# Patient Record
Sex: FEMALE | Race: WHITE | Employment: PART TIME | URBAN - METROPOLITAN AREA
[De-identification: names, ages, dates, MRNs, and addresses within clinical notes are randomized per-mention and may not be internally consistent; named-entity substitution may affect disease eponyms.]

---

## 2019-12-19 PROBLEM — O09.521 AMA (ADVANCED MATERNAL AGE) MULTIGRAVIDA 35+, FIRST TRIMESTER: Status: ACTIVE | Noted: 2019-12-19

## 2019-12-19 PROBLEM — O34.219 PREVIOUS CESAREAN DELIVERY, ANTEPARTUM: Status: ACTIVE | Noted: 2019-12-19

## 2019-12-19 PROBLEM — O99.331 TOBACCO SMOKING AFFECTING PREGNANCY IN FIRST TRIMESTER: Status: ACTIVE | Noted: 2019-12-19

## 2019-12-19 LAB
CHLAMYDIA, EXTERNAL: NEGATIVE
HBSAG, EXTERNAL: NEGATIVE
HEPATITIS C AB,   EXT: NEGATIVE
HIV, EXTERNAL: NON REACTIVE
N. GONORRHEA, EXTERNAL: NEGATIVE
RPR, EXTERNAL: NON REACTIVE
RUBELLA, EXTERNAL: NORMAL

## 2020-02-13 PROBLEM — O99.343 MENTAL DISORDER COMPLICATING PREGNANCY, THIRD TRIMESTER: Status: ACTIVE | Noted: 2020-02-13

## 2020-02-13 PROBLEM — O99.342 MENTAL DISORDER COMPLICATING PREGNANCY, SECOND TRIMESTER: Status: ACTIVE | Noted: 2020-02-13

## 2020-02-13 PROBLEM — O09.522 MULTIGRAVIDA OF ADVANCED MATERNAL AGE IN SECOND TRIMESTER: Status: ACTIVE | Noted: 2019-12-19

## 2020-03-05 PROBLEM — O99.331 TOBACCO SMOKING AFFECTING PREGNANCY IN FIRST TRIMESTER: Status: RESOLVED | Noted: 2019-12-19 | Resolved: 2020-03-05

## 2020-03-09 PROBLEM — O34.10 UTERINE FIBROID DURING PREGNANCY, ANTEPARTUM: Status: ACTIVE | Noted: 2020-03-09

## 2020-03-09 PROBLEM — D25.9 UTERINE FIBROID DURING PREGNANCY, ANTEPARTUM: Status: ACTIVE | Noted: 2020-03-09

## 2020-05-04 PROBLEM — O09.523 MULTIGRAVIDA OF ADVANCED MATERNAL AGE IN THIRD TRIMESTER: Status: ACTIVE | Noted: 2019-12-19

## 2020-07-01 LAB — GRBS, EXTERNAL: NEGATIVE

## 2020-07-23 NOTE — PROGRESS NOTES
Patient ID verified. Allergies, medical history, prenatal record and prior to admission medications verified. Pt instructed to be NPO after midnight. Pt instructed  to arrive at hospital at [facility] come to entrance C and sign in at the registration desk on the 4th floor. Patient instructed to come to hospital sooner if SROM, labor, or concerning symptoms. Patient verbalized understanding. Questions encouraged and answered.

## 2020-07-27 ENCOUNTER — ANESTHESIA EVENT (OUTPATIENT)
Dept: LABOR AND DELIVERY | Age: 35
End: 2020-07-27
Payer: COMMERCIAL

## 2020-07-27 ENCOUNTER — ANESTHESIA (OUTPATIENT)
Dept: LABOR AND DELIVERY | Age: 35
End: 2020-07-27
Payer: COMMERCIAL

## 2020-07-27 ENCOUNTER — HOSPITAL ENCOUNTER (INPATIENT)
Age: 35
LOS: 3 days | Discharge: HOME | End: 2020-07-30
Attending: OBSTETRICS & GYNECOLOGY | Admitting: OBSTETRICS & GYNECOLOGY
Payer: COMMERCIAL

## 2020-07-27 PROBLEM — Z98.891 H/O CESAREAN SECTION: Status: ACTIVE | Noted: 2020-07-27

## 2020-07-27 PROBLEM — O09.529 ADVANCED MATERNAL AGE IN MULTIGRAVIDA: Status: ACTIVE | Noted: 2020-07-27

## 2020-07-27 PROBLEM — Z3A.39 39 WEEKS GESTATION OF PREGNANCY: Status: ACTIVE | Noted: 2020-07-27

## 2020-07-27 LAB
ABO + RH BLD: NORMAL
ALBUMIN SERPL-MCNC: 2.6 G/DL (ref 3.5–5)
ALBUMIN/GLOB SERPL: 0.6 {RATIO} (ref 1.2–3.5)
ALP SERPL-CCNC: 137 U/L (ref 50–136)
ALT SERPL-CCNC: 22 U/L (ref 12–65)
ANION GAP SERPL CALC-SCNC: 9 MMOL/L (ref 7–16)
AST SERPL-CCNC: 20 U/L (ref 15–37)
BASOPHILS # BLD: 0.1 K/UL (ref 0–0.2)
BASOPHILS NFR BLD: 1 % (ref 0–2)
BILIRUB SERPL-MCNC: 0.2 MG/DL (ref 0.2–1.1)
BLOOD GROUP ANTIBODIES SERPL: NORMAL
BUN SERPL-MCNC: 9 MG/DL (ref 6–23)
CALCIUM SERPL-MCNC: 9.1 MG/DL (ref 8.3–10.4)
CHLORIDE SERPL-SCNC: 108 MMOL/L (ref 98–107)
CO2 SERPL-SCNC: 19 MMOL/L (ref 21–32)
CREAT SERPL-MCNC: 0.48 MG/DL (ref 0.6–1)
DIFFERENTIAL METHOD BLD: NORMAL
EOSINOPHIL # BLD: 0.1 K/UL (ref 0–0.8)
EOSINOPHIL NFR BLD: 1 % (ref 0.5–7.8)
ERYTHROCYTE [DISTWIDTH] IN BLOOD BY AUTOMATED COUNT: 13.6 % (ref 11.9–14.6)
GLOBULIN SER CALC-MCNC: 4.7 G/DL (ref 2.3–3.5)
GLUCOSE SERPL-MCNC: 95 MG/DL (ref 65–100)
HCT VFR BLD AUTO: 39.2 % (ref 35.8–46.3)
HGB BLD-MCNC: 13.1 G/DL (ref 11.7–15.4)
IMM GRANULOCYTES # BLD AUTO: 0.1 K/UL (ref 0–0.5)
IMM GRANULOCYTES NFR BLD AUTO: 1 % (ref 0–5)
LYMPHOCYTES # BLD: 2.5 K/UL (ref 0.5–4.6)
LYMPHOCYTES NFR BLD: 26 % (ref 13–44)
MCH RBC QN AUTO: 30.8 PG (ref 26.1–32.9)
MCHC RBC AUTO-ENTMCNC: 33.4 G/DL (ref 31.4–35)
MCV RBC AUTO: 92.2 FL (ref 79.6–97.8)
MONOCYTES # BLD: 1 K/UL (ref 0.1–1.3)
MONOCYTES NFR BLD: 11 % (ref 4–12)
NEUTS SEG # BLD: 6 K/UL (ref 1.7–8.2)
NEUTS SEG NFR BLD: 61 % (ref 43–78)
NRBC # BLD: 0 K/UL (ref 0–0.2)
PLATELET # BLD AUTO: 207 K/UL (ref 150–450)
PMV BLD AUTO: 10.6 FL (ref 9.4–12.3)
POTASSIUM SERPL-SCNC: 3.7 MMOL/L (ref 3.5–5.1)
PROT SERPL-MCNC: 7.3 G/DL (ref 6.3–8.2)
RBC # BLD AUTO: 4.25 M/UL (ref 4.05–5.2)
SODIUM SERPL-SCNC: 136 MMOL/L (ref 136–145)
SPECIMEN EXP DATE BLD: NORMAL
WBC # BLD AUTO: 9.8 K/UL (ref 4.3–11.1)

## 2020-07-27 PROCEDURE — 77030027744 HC PWDR HEMSTAT ARISTA ABSRB 5GM BARD -D: Performed by: OBSTETRICS & GYNECOLOGY

## 2020-07-27 PROCEDURE — C1765 ADHESION BARRIER: HCPCS | Performed by: OBSTETRICS & GYNECOLOGY

## 2020-07-27 PROCEDURE — 76010000392 HC C SECN EA ADDL 0.5 HR: Performed by: OBSTETRICS & GYNECOLOGY

## 2020-07-27 PROCEDURE — 77030019905 HC CATH URETH INTMIT MDII -A

## 2020-07-27 PROCEDURE — 77030007880 HC KT SPN EPDRL BBMI -B: Performed by: ANESTHESIOLOGY

## 2020-07-27 PROCEDURE — 77030032490 HC SLV COMPR SCD KNE COVD -B: Performed by: OBSTETRICS & GYNECOLOGY

## 2020-07-27 PROCEDURE — 80053 COMPREHEN METABOLIC PANEL: CPT

## 2020-07-27 PROCEDURE — 74011000250 HC RX REV CODE- 250: Performed by: OBSTETRICS & GYNECOLOGY

## 2020-07-27 PROCEDURE — 74011000250 HC RX REV CODE- 250: Performed by: STUDENT IN AN ORGANIZED HEALTH CARE EDUCATION/TRAINING PROGRAM

## 2020-07-27 PROCEDURE — 74011000250 HC RX REV CODE- 250: Performed by: ANESTHESIOLOGY

## 2020-07-27 PROCEDURE — 75410000003 HC RECOV DEL/VAG/CSECN EA 0.5 HR: Performed by: OBSTETRICS & GYNECOLOGY

## 2020-07-27 PROCEDURE — 74011250636 HC RX REV CODE- 250/636: Performed by: OBSTETRICS & GYNECOLOGY

## 2020-07-27 PROCEDURE — 77030002966 HC SUT PDS J&J -A: Performed by: OBSTETRICS & GYNECOLOGY

## 2020-07-27 PROCEDURE — 76010000391 HC C SECN FIRST 1 HR: Performed by: OBSTETRICS & GYNECOLOGY

## 2020-07-27 PROCEDURE — 85025 COMPLETE CBC W/AUTO DIFF WBC: CPT

## 2020-07-27 PROCEDURE — 77030031139 HC SUT VCRL2 J&J -A: Performed by: OBSTETRICS & GYNECOLOGY

## 2020-07-27 PROCEDURE — 86900 BLOOD TYPING SEROLOGIC ABO: CPT

## 2020-07-27 PROCEDURE — 77030040361 HC SLV COMPR DVT MDII -B

## 2020-07-27 PROCEDURE — 76060000078 HC EPIDURAL ANESTHESIA: Performed by: OBSTETRICS & GYNECOLOGY

## 2020-07-27 PROCEDURE — 74011250637 HC RX REV CODE- 250/637: Performed by: ANESTHESIOLOGY

## 2020-07-27 PROCEDURE — 77030008459 HC STPLR SKN COOP -B: Performed by: OBSTETRICS & GYNECOLOGY

## 2020-07-27 PROCEDURE — 59510 CESAREAN DELIVERY: CPT | Performed by: OBSTETRICS & GYNECOLOGY

## 2020-07-27 PROCEDURE — 77030002933 HC SUT MCRYL J&J -A: Performed by: OBSTETRICS & GYNECOLOGY

## 2020-07-27 PROCEDURE — 74011250636 HC RX REV CODE- 250/636: Performed by: ANESTHESIOLOGY

## 2020-07-27 PROCEDURE — 77030003665 HC NDL SPN BBMI -A: Performed by: ANESTHESIOLOGY

## 2020-07-27 PROCEDURE — 77030018846 HC SOL IRR STRL H20 ICUM -A: Performed by: OBSTETRICS & GYNECOLOGY

## 2020-07-27 PROCEDURE — 77030018836 HC SOL IRR NACL ICUM -A: Performed by: OBSTETRICS & GYNECOLOGY

## 2020-07-27 PROCEDURE — 74011250636 HC RX REV CODE- 250/636: Performed by: STUDENT IN AN ORGANIZED HEALTH CARE EDUCATION/TRAINING PROGRAM

## 2020-07-27 PROCEDURE — 65270000029 HC RM PRIVATE

## 2020-07-27 RX ORDER — MORPHINE SULFATE 10 MG/ML
5 INJECTION, SOLUTION INTRAMUSCULAR; INTRAVENOUS
Status: DISPENSED | OUTPATIENT
Start: 2020-07-27 | End: 2020-07-28

## 2020-07-27 RX ORDER — SODIUM CHLORIDE 0.9 % (FLUSH) 0.9 %
5-40 SYRINGE (ML) INJECTION AS NEEDED
Status: DISCONTINUED | OUTPATIENT
Start: 2020-07-27 | End: 2020-07-28

## 2020-07-27 RX ORDER — OXYTOCIN/RINGER'S LACTATE 30/500 ML
PLASTIC BAG, INJECTION (ML) INTRAVENOUS
Status: DISCONTINUED | OUTPATIENT
Start: 2020-07-27 | End: 2020-07-27 | Stop reason: HOSPADM

## 2020-07-27 RX ORDER — ONDANSETRON 2 MG/ML
INJECTION INTRAMUSCULAR; INTRAVENOUS AS NEEDED
Status: DISCONTINUED | OUTPATIENT
Start: 2020-07-27 | End: 2020-07-27 | Stop reason: HOSPADM

## 2020-07-27 RX ORDER — MORPHINE SULFATE 0.5 MG/ML
INJECTION, SOLUTION EPIDURAL; INTRATHECAL; INTRAVENOUS AS NEEDED
Status: DISCONTINUED | OUTPATIENT
Start: 2020-07-27 | End: 2020-07-27 | Stop reason: HOSPADM

## 2020-07-27 RX ORDER — DIPHENHYDRAMINE HYDROCHLORIDE 50 MG/ML
12.5 INJECTION, SOLUTION INTRAMUSCULAR; INTRAVENOUS
Status: DISCONTINUED | OUTPATIENT
Start: 2020-07-27 | End: 2020-07-30 | Stop reason: HOSPADM

## 2020-07-27 RX ORDER — KETOROLAC TROMETHAMINE 30 MG/ML
INJECTION, SOLUTION INTRAMUSCULAR; INTRAVENOUS AS NEEDED
Status: DISCONTINUED | OUTPATIENT
Start: 2020-07-27 | End: 2020-07-27 | Stop reason: HOSPADM

## 2020-07-27 RX ORDER — NALOXONE HYDROCHLORIDE 0.4 MG/ML
0.1 INJECTION, SOLUTION INTRAMUSCULAR; INTRAVENOUS; SUBCUTANEOUS
Status: DISCONTINUED | OUTPATIENT
Start: 2020-07-27 | End: 2020-07-30 | Stop reason: HOSPADM

## 2020-07-27 RX ORDER — KETOROLAC TROMETHAMINE 30 MG/ML
30 INJECTION, SOLUTION INTRAMUSCULAR; INTRAVENOUS
Status: DISPENSED | OUTPATIENT
Start: 2020-07-27 | End: 2020-07-28

## 2020-07-27 RX ORDER — BUPIVACAINE HYDROCHLORIDE 7.5 MG/ML
INJECTION, SOLUTION INTRASPINAL
Status: COMPLETED | OUTPATIENT
Start: 2020-07-27 | End: 2020-07-27

## 2020-07-27 RX ORDER — CEFAZOLIN SODIUM/WATER 2 G/20 ML
2 SYRINGE (ML) INTRAVENOUS ONCE
Status: COMPLETED | OUTPATIENT
Start: 2020-07-27 | End: 2020-07-27

## 2020-07-27 RX ORDER — ONDANSETRON 2 MG/ML
4 INJECTION INTRAMUSCULAR; INTRAVENOUS AS NEEDED
Status: DISCONTINUED | OUTPATIENT
Start: 2020-07-27 | End: 2020-07-28

## 2020-07-27 RX ORDER — SODIUM CHLORIDE 0.9 % (FLUSH) 0.9 %
5-40 SYRINGE (ML) INJECTION EVERY 8 HOURS
Status: DISCONTINUED | OUTPATIENT
Start: 2020-07-27 | End: 2020-07-27 | Stop reason: HOSPADM

## 2020-07-27 RX ORDER — MIDAZOLAM HYDROCHLORIDE 1 MG/ML
INJECTION, SOLUTION INTRAMUSCULAR; INTRAVENOUS AS NEEDED
Status: DISCONTINUED | OUTPATIENT
Start: 2020-07-27 | End: 2020-07-27 | Stop reason: HOSPADM

## 2020-07-27 RX ORDER — SODIUM CHLORIDE 0.9 % (FLUSH) 0.9 %
5-40 SYRINGE (ML) INJECTION AS NEEDED
Status: DISCONTINUED | OUTPATIENT
Start: 2020-07-27 | End: 2020-07-27 | Stop reason: HOSPADM

## 2020-07-27 RX ORDER — SODIUM CHLORIDE, SODIUM LACTATE, POTASSIUM CHLORIDE, CALCIUM CHLORIDE 600; 310; 30; 20 MG/100ML; MG/100ML; MG/100ML; MG/100ML
150 INJECTION, SOLUTION INTRAVENOUS CONTINUOUS
Status: DISCONTINUED | OUTPATIENT
Start: 2020-07-27 | End: 2020-07-27 | Stop reason: HOSPADM

## 2020-07-27 RX ORDER — SODIUM CHLORIDE 0.9 % (FLUSH) 0.9 %
5-40 SYRINGE (ML) INJECTION EVERY 8 HOURS
Status: DISCONTINUED | OUTPATIENT
Start: 2020-07-27 | End: 2020-07-30 | Stop reason: HOSPADM

## 2020-07-27 RX ORDER — OXYTOCIN/RINGER'S LACTATE 30/500 ML
250 PLASTIC BAG, INJECTION (ML) INTRAVENOUS ONCE
Status: DISCONTINUED | OUTPATIENT
Start: 2020-07-27 | End: 2020-07-27 | Stop reason: HOSPADM

## 2020-07-27 RX ORDER — SODIUM CHLORIDE, SODIUM LACTATE, POTASSIUM CHLORIDE, CALCIUM CHLORIDE 600; 310; 30; 20 MG/100ML; MG/100ML; MG/100ML; MG/100ML
75 INJECTION, SOLUTION INTRAVENOUS CONTINUOUS
Status: DISCONTINUED | OUTPATIENT
Start: 2020-07-27 | End: 2020-07-28

## 2020-07-27 RX ORDER — DEXTROSE, SODIUM CHLORIDE, SODIUM LACTATE, POTASSIUM CHLORIDE, AND CALCIUM CHLORIDE 5; .6; .31; .03; .02 G/100ML; G/100ML; G/100ML; G/100ML; G/100ML
125 INJECTION, SOLUTION INTRAVENOUS CONTINUOUS
Status: DISCONTINUED | OUTPATIENT
Start: 2020-07-27 | End: 2020-07-27 | Stop reason: HOSPADM

## 2020-07-27 RX ORDER — EPHEDRINE SULFATE/0.9% NACL/PF 50 MG/5 ML
SYRINGE (ML) INTRAVENOUS AS NEEDED
Status: DISCONTINUED | OUTPATIENT
Start: 2020-07-27 | End: 2020-07-27 | Stop reason: HOSPADM

## 2020-07-27 RX ORDER — TRISODIUM CITRATE DIHYDRATE AND CITRIC ACID MONOHYDRATE 500; 334 MG/5ML; MG/5ML
30 SOLUTION ORAL ONCE
Status: COMPLETED | OUTPATIENT
Start: 2020-07-27 | End: 2020-07-27

## 2020-07-27 RX ORDER — FENTANYL CITRATE 50 UG/ML
INJECTION, SOLUTION INTRAMUSCULAR; INTRAVENOUS AS NEEDED
Status: DISCONTINUED | OUTPATIENT
Start: 2020-07-27 | End: 2020-07-27 | Stop reason: HOSPADM

## 2020-07-27 RX ORDER — OXYCODONE HYDROCHLORIDE 5 MG/1
10 TABLET ORAL
Status: DISPENSED | OUTPATIENT
Start: 2020-07-27 | End: 2020-07-28

## 2020-07-27 RX ADMIN — Medication 500 ML/HR: at 08:19

## 2020-07-27 RX ADMIN — CEFAZOLIN SODIUM 2 G: 100 INJECTION, POWDER, LYOPHILIZED, FOR SOLUTION INTRAVENOUS at 07:46

## 2020-07-27 RX ADMIN — BUPIVACAINE HYDROCHLORIDE IN DEXTROSE 8 MG: 7.5 INJECTION, SOLUTION SUBARACHNOID at 07:57

## 2020-07-27 RX ADMIN — SODIUM CHLORIDE, SODIUM LACTATE, POTASSIUM CHLORIDE, AND CALCIUM CHLORIDE 75 ML/HR: 600; 310; 30; 20 INJECTION, SOLUTION INTRAVENOUS at 09:55

## 2020-07-27 RX ADMIN — PHENYLEPHRINE HYDROCHLORIDE 160 MCG: 10 INJECTION INTRAVENOUS at 08:44

## 2020-07-27 RX ADMIN — Medication 20 MG: at 08:05

## 2020-07-27 RX ADMIN — FENTANYL CITRATE 100 MCG: 50 INJECTION INTRAMUSCULAR; INTRAVENOUS at 08:32

## 2020-07-27 RX ADMIN — MORPHINE SULFATE 200 MCG: 0.5 INJECTION, SOLUTION EPIDURAL; INTRATHECAL; INTRAVENOUS at 07:57

## 2020-07-27 RX ADMIN — KETOROLAC TROMETHAMINE 30 MG: 30 INJECTION, SOLUTION INTRAMUSCULAR at 20:17

## 2020-07-27 RX ADMIN — MORPHINE SULFATE 2.8 MG: 0.5 INJECTION, SOLUTION EPIDURAL; INTRATHECAL; INTRAVENOUS at 08:24

## 2020-07-27 RX ADMIN — KETOROLAC TROMETHAMINE 30 MG: 30 INJECTION, SOLUTION INTRAMUSCULAR; INTRAVENOUS at 08:44

## 2020-07-27 RX ADMIN — MORPHINE SULFATE 5 MG: 10 INJECTION INTRAVENOUS at 09:26

## 2020-07-27 RX ADMIN — MORPHINE SULFATE 2 MG: 0.5 INJECTION, SOLUTION EPIDURAL; INTRATHECAL; INTRAVENOUS at 08:22

## 2020-07-27 RX ADMIN — MIDAZOLAM 2 MG: 1 INJECTION INTRAMUSCULAR; INTRAVENOUS at 08:22

## 2020-07-27 RX ADMIN — SODIUM CITRATE AND CITRIC ACID MONOHYDRATE 30 ML: 500; 334 SOLUTION ORAL at 07:45

## 2020-07-27 RX ADMIN — PHENYLEPHRINE HYDROCHLORIDE 160 MCG: 10 INJECTION INTRAVENOUS at 08:06

## 2020-07-27 RX ADMIN — OXYCODONE 10 MG: 5 TABLET ORAL at 13:05

## 2020-07-27 RX ADMIN — ONDANSETRON 4 MG: 2 INJECTION INTRAMUSCULAR; INTRAVENOUS at 08:19

## 2020-07-27 RX ADMIN — SODIUM CHLORIDE, SODIUM LACTATE, POTASSIUM CHLORIDE, AND CALCIUM CHLORIDE 150 ML/HR: 600; 310; 30; 20 INJECTION, SOLUTION INTRAVENOUS at 07:00

## 2020-07-27 NOTE — ROUTINE PROCESS
SBAR OUT Report: Mother Verbal report given to Lon [facility] RN on this patient, who is now being transferred to [facility] (unit) for routine progression of care. The patient is wearing a green \"Anesthesia-Duramorph\" band. Report consisted of patient's Situation, Background, Assessment and Recommendations (SBAR). [place] ID bands were compared with the identification form, and verified with the patient and receiving nurse. Information from the SBAR and Kardex and the [address] [facility] Report was reviewed with the receiving nurse; opportunity for questions and clarification provided.

## 2020-07-27 NOTE — ANESTHESIA POSTPROCEDURE EVALUATION
Procedure(s):   SECTION.     spinal    Anesthesia Post Evaluation      Multimodal analgesia: multimodal analgesia used between 6 hours prior to anesthesia start to PACU discharge  Patient location during evaluation: floor  Patient participation: complete - patient participated  Level of consciousness: awake  Pain management: adequate  Airway patency: patent  Anesthetic complications: no  Cardiovascular status: acceptable  Respiratory status: acceptable  Hydration status: acceptable  Post anesthesia nausea and vomiting:  none  Final Post Anesthesia Temperature Assessment:  Normothermia (36.0-37.5 degrees C)      INITIAL Post-op Vital signs:   Vitals Value Taken Time   /75 2020  9:45 AM   Temp     Pulse 79 2020  9:45 AM   Resp 18 2020  9:45 AM   SpO2 100 % 2020  9:45 AM

## 2020-07-27 NOTE — ANESTHESIA PREPROCEDURE EVALUATION
Relevant Problems   No relevant active problems       Anesthetic History   No history of anesthetic complications            Review of Systems / Medical History  Patient summary reviewed and pertinent labs reviewed    Pulmonary  Within defined limits                 Neuro/Psych         Psychiatric history    Comments: Depression, borderline personality disorder Cardiovascular                  Exercise tolerance: >4 METS     GI/Hepatic/Renal  Within defined limits              Endo/Other        Obesity     Other Findings            Physical Exam    Airway  Mallampati: II  TM Distance: > 6 cm  Neck ROM: normal range of motion   Mouth opening: Normal     Cardiovascular    Rhythm: regular           Dental  No notable dental hx       Pulmonary                 Abdominal  GI exam deferred       Other Findings            Anesthetic Plan    ASA: 3  Anesthesia type: spinal      Post-op pain plan if not by surgeon: intrathecal opiates    Induction: Intravenous  Anesthetic plan and risks discussed with: Patient

## 2020-07-27 NOTE — PROGRESS NOTES
Received care of pt from ANTIONE Larkin. Plan of care reviewed with pt and , both verbalizes understanding.

## 2020-07-27 NOTE — LACTATION NOTE
This note was copied from a baby's chart. Lactation visit. 2nd time mom, did breastfeed first child who is a teenager now. This baby only a few hours old. Mom reports that baby recently latched and fed x 30 minutes on right breast. Mom states baby latches well and stays on well. Baby skin to skin with mom now. Discussed expectations for the first 24 hours of life. Reviewed feeding cues and feeding on demand. Attempt often. Baby has voided. Mom states right nipple sore post feed, lanolin given. Offered lactation assistance today with positioning/latch and observation of latch, mom encouraged to call out.

## 2020-07-27 NOTE — H&P
Scottie [facility] OB H&P      Assessment/Plan    Problem List  Date Reviewed: 2020          Codes Class    H/O  section ICD-10-CM: Z98.891  ICD-9-CM: V45.89         39 weeks gestation of pregnancy ICD-10-CM: Z3A.39  ICD-9-CM: V22.2         Advanced maternal age in multigravida ICD-10-CM: O09.529  ICD-9-CM: 659.60         Uterine fibroid during pregnancy, antepartum ICD-10-CM: O34.10, D25.9  ICD-9-CM: 654.13, 218.9     Overview Addendum 2020  9:18 AM by Inocencio Choudhury MD     3/9/2020 at [facility]:  Fibroid noted measuring 4.8 x 3.4 x 4.0 cm.    2020 at [facility]:  Fibroids stable in size and appearance. · Fibroid pain precautions given. · PTL and cervical insufficiency precautions given. Mental disorder affecting pregnancy in third trimester ICD-10-CM: O99.343  ICD-9-CM: 648.43     Overview Addendum 2020  9:17 AM by Inocencio Choudhury MD     2020: hx depression, anxiety, BPD.   3/9/2020 at [facility]:  Patient with depression/anxiety. Not currently on medication; stable. Denies SI/HI.  2020 at [facility]:  symptoms have developed, denies SI/HI - starting Zoloft  2020 at [facility]:  Stable on Zoloft 50 mg daily. Denies SI/HI. Multigravida of advanced maternal age in third trimester ICD-10-CM: O09.523  ICD-9-CM: 659.63     Overview Addendum 2020  2:09 PM by Elizabeth Castano NP     EDC by LMP confirmed by 7  weeks US  PLAN:  NIPT (neg x 3, male), baby ASA 12-36 weeks, MFM for anatomy/fetal echo,  testing around 35 weeks with delivery around 39 weeks  2020: Pt reports allergy to ASA (unsure reaction, told as child to avoid ASA) but is taking ASA 81mg daily  CF, SMA, AFP negative    3/9/2020 at [facility]:  Normal anatomy/fetal Echo, AC 49%. Fibroid noted measuring 4.8 x 3.4 x 4.0 cm. Met with genetic counselor today; see her report for full details. [id number] Negative, Male (drawn in OB's). 2020 at [facility]:  Appropriate fetal growth, normal f/u Echo. Fibroid stable in size and appearance. AC 49%, overall 45%, POLA 20 cm  6/10/2020:  EFW 46%, AC 48%, POLA 17.7 cm, vtx  2020: EFW 47% AC 58%, BPP 8/8, POLA nl, vertex  2020: EFW 51%, AC 50%, BPP 8/8, POLA nl, vertex             Previous  delivery, antepartum ICD-10-CM: O34.219  ICD-9-CM: 654.23     Overview Addendum 6/10/2020 11:49 AM by Jeremy Barriga MD     D/W pt risks of , including but not limited to: risk of uterine rupture during labor of approximately 1% with a subsequent 40-50% risk of  neurological injury and or death. D/W pt operative risks of repeat C/S including but not limited to death, bleeding, infection, damage to other internal organs and other risks involving future pregnancies and mode of delivery. Also, D/W pt increase in maternal and fetal morbidity after failed JAYDEN.     3/9/2020 [facility]: vascularity at site of MARY. MFM to reassess in 6-8 weeks -- WNL  2020:  Pt desires repeat when indicated    6/10/2020:  Scheduled for 20 @ 0800 [address] sched 6/10 at 11:48)                   Subjective    Claudell Endo 37 y.o.  39w2d  presented to L&D for repeat C/S. Pt has no complaints today. Pt denies vaginal bleeding/discharge. No LOF.  +FM. OB History    Para Term  AB Living   3 1 1 0 1 1   SAB TAB Ectopic Molar Multiple Live Births   1 0 0 0 0 1      # Outcome Date GA Lbr Riki/2nd Weight Sex Delivery Anes PTL Lv   3 Current            2 Term 06 40w0d  6 lb 3 oz (2.807 kg) F CS-Unspec EPI N RAMO      Complications: Other (comment), Fetal Intolerance, Failure to Progress in Second Stage   1 SAB  5w0d             Birth Comments: no D&C required      Obstetric Comments    was due to not dilating.        Past Medical History:   Diagnosis Date    Anxiety     Borderline personality disorder ([facility] [address]     Fibroid, uterine     Major depression        Past Surgical History:   Procedure Laterality Date    HX  SECTION  HX WISDOM TEETH EXTRACTION         Family History   Problem Relation Age of Onset   Esther Christopher Arthritis-rheumatoid Mother     Heart Attack Father     Cancer Brother     Cancer Maternal Grandmother        Social History     Socioeconomic History    Marital status:      Spouse name: Not on file    Number of children: Not on file    Years of education: Not on file    Highest education level: Not on file   Occupational History    Not on file   Social Needs    Financial resource strain: Not on file    Food insecurity     Worry: Not on file     Inability: Not on file    Transportation needs     Medical: Not on file     Non-medical: Not on file   Tobacco Use    Smoking status: Former Smoker     Last attempt to quit: 2020     Years since quittin.5    Smokeless tobacco: Never Used   Substance and Sexual Activity    Alcohol use: Not Currently    Drug use: Not Currently     Types: Marijuana    Sexual activity: Yes   Lifestyle    Physical activity     Days per week: Not on file     Minutes per session: Not on file    Stress: Not on file   Relationships    Social connections     Talks on phone: Not on file     Gets together: Not on file     Attends Confucianist service: Not on file     Active member of club or organization: Not on file     Attends meetings of clubs or organizations: Not on file     Relationship status: Not on file    Intimate partner violence     Fear of current or ex partner: No     Emotionally abused: No     Physically abused: No     Forced sexual activity: No   Other Topics Concern     Service Not Asked    Blood Transfusions Not Asked    Caffeine Concern No    Occupational Exposure Not Asked    Hobby Hazards Not Asked    Sleep Concern Not Asked    Stress Concern Not Asked    Weight Concern Not Asked    Special Diet Not Asked    Back Care Not Asked    Exercise No    Bike Helmet Not Asked    Seat Belt Yes    Self-Exams No   Social History Narrative    Abuse: Feels safe at home, no history of physical abuse, no history of sexual abuse       Allergies   Allergen Reactions    Aspirin Other (comments)     Reyes Disease    Gabapentin Hives    Lamotrigine Hives     Depression, mood swings  Lamictal         Review of Systems:    Constitutional: No fevers or chills     Prenatal: + fetal movement, no VB/DC, no LOF     CV: No chest pain or palpatations    Resp: No SOB or cough    GI: No nausea/vomiting/diarrhea/constipation    Neuro: No HA, no seizure like activity    Skin: No rashes or lesions     Breast: No breast pain    : No dysuria or hematuria      Prenatal Record Review    The prenatal record has been reviewed.     Prenatal Labs:   No results found for: RUBELLAEXT, GRBSEXT, HBSAGEXT, HIVEXT, RPREXT, GONNOEXT, CHLAMEXT    Objective    Visit Vitals  /82   Pulse 64   Temp 97.6 °F (36.4 °C)   Resp 16   Ht 5' 1\" (1.549 m)   Wt 208 lb (94.3 kg)   LMP 10/26/2019   Breastfeeding Yes   BMI 39.30 kg/m²           Physical Exam    Gen: alert and cooperative, NAD    HEENT: NCAT    CV: RRR    RESP: CTA bilat    ABD: Gravid, soft, NT    EXT: trace edema bilat    NEURO: No focal deficits    SKIN: No noted rashes or lesions       Dia Munoz MD  7:38 AM  07/27/20

## 2020-07-27 NOTE — L&D DELIVERY NOTE
Delivery Summary    Patient: Rachelle Diamond MRN: [id number]  SSN: [id number]    YOB: 1985  Age: 37 y.o. Sex: female        Information for the patient's :  Nita Camacho [[id number]]       Labor Events:    Labor: No    Steroids: None   Cervical Ripening Date/Time:       Cervical Ripening Type: None   Antibiotics During Labor: No   Rupture Identifier:      Rupture Date/Time: 2020 8:18 AM   Rupture Type: AROM   Amniotic Fluid Volume: Moderate    Amniotic Fluid Description: Clear    Amniotic Fluid Odor: None    Induction: None       Induction Date/Time:        Indications for Induction:      Augmentation: None   Augmentation Date/Time:      Indications for Augmentation:     Labor complications: None       Additional complications:        Delivery Events:  Indications For Episiotomy:     Episiotomy:     Perineal Laceration(s):     Repaired:     Periurethral Laceration Location:      Repaired:     Labial Laceration Location:     Repaired:     Sulcal Laceration Location:     Repaired:     Vaginal Laceration Location:     Repaired:     Cervical Laceration Location:     Repaired:     Repair Suture:     Number of Repair Packets:     Estimated Blood Loss (ml):  ml   Quantitaive Blood Loss (ml):             Delivery Date: 2020    Delivery Time: 8:18 AM   Delivery Type: , Low Transverse     Details    Trial of Labor: No   Primary/Repeat: Repeat   Priority: Routine   Indications:          Sex:  Male     Gestational Age: 44w2d  Delivery Clinician:  Brooke Hopson  Living Status: Living   Delivery Location: OR OR #2          APGARS  One minute Five minutes Ten minutes   Skin color: 0   1        Heart rate: 2   2        Grimace: 2   2        Muscle tone: 2   2        Breathin   2        Totals: 8   9          Presentation: Vertex    Position:        Resuscitation Method:  Suctioning-bulb; Tactile Stimulation     Meconium Stained:        Cord Information:    Complications:    Cord around:    Delayed cord clamping? Cord clamped date/time:   Disposition of Cord Blood:      Blood Gases Sent?:      Placenta:  Date/Time: 2020  8:19 AM  Removal:        Appearance:        Measurements:  Birth Weight: 7 lb 4.1 oz (3.29 kg)      Birth Length: 1' 8.08\" (0.51 m)      Head Circumference: 1' 2.17\" (0.36 m)      Chest Circumference: 1' 1.39\" (0.34 m)     Abdominal Girth: Other Providers:   Shayne Chavez, SARAH SALGUERO;COOKIE GRAHAM;SÁNCHEZ HUGGINS;CHRISTA GAMBOA;GAVIN ANSARI;LYRIC HARTMANN;EFFIE WONG;RENNY SMITH, Obstetrician;Primary Nurse;Primary  Nurse;Neonatologist;Anesthesiologist;Crna;Respiratory Therapist;Scrub Tech             Group B Strep: No results found for: GRBSEXT, GRBSEXT  Information for the patient's :  Ruel Krueger [[id number]]   No results found for: ABORH, PCTABR, PCTDIG, BILI, ABORHEXT, ABORH     No results for input(s): PCO2CB, PO2CB, HCO3I, SO2I, IBD, PTEMPI, SPECTI, PHICB, ISITE, IDEV, IALLEN in the last 72 hours.

## 2020-07-27 NOTE — ANESTHESIA PROCEDURE NOTES
Spinal Block    Start time: 7/27/2020 7:54 AM  End time: 7/27/2020 7:57 AM  Performed by: Yudelka Sorensen MD  Authorized by: Yudelka Sorensen MD     Pre-procedure:   Indications: primary anesthetic  Preanesthetic Checklist: patient identified, risks and benefits discussed, anesthesia consent, site marked, patient being monitored and timeout performed    Timeout Time: 07:54          Spinal Block:   Patient Position:  Seated  Prep Region:  Lumbar  Prep: chlorhexidine and patient draped      Location:  L2-3  Technique:  Single shot        Needle:   Needle Type:  Pencan  Needle Gauge:  25 G  Attempts:  1      Events: CSF confirmed, no blood with aspiration and no paresthesia        Assessment:  Insertion:  Uncomplicated  Patient tolerance:  Patient tolerated the procedure well with no immediate complications

## 2020-07-27 NOTE — PROGRESS NOTES
SBAR IN Report: Mother    Verbal report received from ANTIONE Dorsey RN (full name & credentials) on this patient, who is now being transferred from [facility] (unit) for routine post - op. The patient is wearing a green \"Anesthesia-Duramorph\" band. Report consisted of patient's Situation, Background, Assessment and Recommendations (SBAR). [place] ID bands were compared with the identification form, and verified with the patient and transferring nurse. Information from the Procedure Summary and the [place] Report was reviewed with the transferring nurse; opportunity for questions and clarification provided.

## 2020-07-27 NOTE — PROGRESS NOTES
0751:  In OR; pre spinal 's  0800: Cho; post spinal 's  0802:  Prep  3928:  Timeout  0811:  incision  0818:  Viable male, apgars 8/9 wt 7-4  0845: Incision closed  2479:  Dressing on   0856:  Out of OR; pt holding  skin to skin; QBL at this time, 785  0900:  Received care of pt from Orville Vo CRNA. Pt a/o x 3. Rates pain 4/10, declines interventions. NSR noted on cardiac monitor. Dressing to abdomen clean, dry and intact. Cho draining clear, yellow urine. Small amt of lochia noted. SCD's in place bilaterally. 5716:  C/o increase pain 5-6.  5 mg morphine given IVSP  0935:  Reports a decrease in pain  1000:  Recovery completed. No changes.

## 2020-07-27 NOTE — OP NOTES
Froy Ramos  1985    Preop Dx:   1. IUP @ 39 2/7 weeks gestation   2. Previous  - desires repeat    Postop Dx: Same    Procedure: repeat LTCS    Surgeon: Yfn Sánchez      Anesthesia: spinal    EBL: 600 mL  IVF: 900 mL  UOP: 798 mL    Complications: None    Findings:  Viable Male infant. Vtx position. APGARS 8,9.  Weight:  7 lbs, 4 oz. Normal appearing uterus, tubes and ovaries. Small (4-5 cm) anterior fundal fibroid. Procedure:  Pt was taken to the operating room where spinal anesthesia was found to be adequate. She was then prepped and draped in the usual sterile fashion and placed in the supine position with a leftward tilt. A Pfannenstiel skin incision was made with the scalpel and carried down to the underlying layer of fascia with the bovie. The fascia was incised in the midline and the incision extended laterally with the ferrara scissors. Superior of the fascial incision was grasped with Kocher clamps and  from the rectus muscles sharply and bluntly. In a similar fashion, the inferior aspect of the fascial incision was grasped with the Kocher clamps and  from the rectus muscles sharply and bluntly. The rectus muscles were  in the midline bluntly and peritoneum entered bluntly. The peritoneal incision was extended manually. Bladder blade was inserted and lower uterine incision made with the scalpel. Incision extended manually laterally. Infants head delivered atraumatically. Nose and mouth suctioned. Cord clamped and cut. Infant handed off to the waiting NICU staff. The uterus was exteriorized and cleared of all clots and debris. Hysterotomy was closed with 0-vicryl in a running, locking fashion. A second layer of 0-vicryl was placed in a interrupted figure of 8 fashion to imbricate the incision. The pelvis and gutters were cleared of all clots and debris, the uterus returned to the abdomen and hemostasis was assured throughout.   Intercede was placed over the hysterotomy site. Fascial incision repaired with 0-PDS in a running fashion. Subcutaneous tissue was cleared of all clots and debris and hemostasis assured. Subcutaneous tissue reapproximated with 3-0 vicryl rapide in a running fashion. Skin closed with Insorb in usual fashion. Pt tolerated procedure well. Sponge, lap and needle counts correct x 3. Disposition: Pt to RR stable and infant to  nursery stable.     Pathology: none      Chloé Hernandez MD  8:58 AM  20

## 2020-07-27 NOTE — LACTATION NOTE

## 2020-07-28 LAB
ERYTHROCYTE [DISTWIDTH] IN BLOOD BY AUTOMATED COUNT: 13.7 % (ref 11.9–14.6)
HCT VFR BLD AUTO: 30.9 % (ref 35.8–46.3)
HGB BLD-MCNC: 10.1 G/DL (ref 11.7–15.4)
MCH RBC QN AUTO: 30.9 PG (ref 26.1–32.9)
MCHC RBC AUTO-ENTMCNC: 32.7 G/DL (ref 31.4–35)
MCV RBC AUTO: 94.5 FL (ref 79.6–97.8)
NRBC # BLD: 0 K/UL (ref 0–0.2)
PLATELET # BLD AUTO: 146 K/UL (ref 150–450)
PMV BLD AUTO: 10.1 FL (ref 9.4–12.3)
RBC # BLD AUTO: 3.27 M/UL (ref 4.05–5.2)
WBC # BLD AUTO: 10.6 K/UL (ref 4.3–11.1)

## 2020-07-28 PROCEDURE — 65270000029 HC RM PRIVATE

## 2020-07-28 PROCEDURE — 74011250637 HC RX REV CODE- 250/637: Performed by: ANESTHESIOLOGY

## 2020-07-28 PROCEDURE — 74011250637 HC RX REV CODE- 250/637: Performed by: OBSTETRICS & GYNECOLOGY

## 2020-07-28 PROCEDURE — 36415 COLL VENOUS BLD VENIPUNCTURE: CPT

## 2020-07-28 PROCEDURE — 85027 COMPLETE CBC AUTOMATED: CPT

## 2020-07-28 PROCEDURE — 74011250636 HC RX REV CODE- 250/636: Performed by: ANESTHESIOLOGY

## 2020-07-28 RX ORDER — SODIUM CHLORIDE 0.9 % (FLUSH) 0.9 %
5-40 SYRINGE (ML) INJECTION EVERY 8 HOURS
Status: DISCONTINUED | OUTPATIENT
Start: 2020-07-28 | End: 2020-07-30 | Stop reason: HOSPADM

## 2020-07-28 RX ORDER — SODIUM CHLORIDE, SODIUM LACTATE, POTASSIUM CHLORIDE, CALCIUM CHLORIDE 600; 310; 30; 20 MG/100ML; MG/100ML; MG/100ML; MG/100ML
150 INJECTION, SOLUTION INTRAVENOUS CONTINUOUS
Status: DISCONTINUED | OUTPATIENT
Start: 2020-07-28 | End: 2020-07-28

## 2020-07-28 RX ORDER — IBUPROFEN 600 MG/1
600 TABLET ORAL
Status: DISCONTINUED | OUTPATIENT
Start: 2020-07-28 | End: 2020-07-30 | Stop reason: HOSPADM

## 2020-07-28 RX ORDER — PRENATAL VIT 96/IRON FUM/FOLIC 27MG-0.8MG
1 TABLET ORAL DAILY
Status: DISCONTINUED | OUTPATIENT
Start: 2020-07-28 | End: 2020-07-30 | Stop reason: HOSPADM

## 2020-07-28 RX ORDER — MORPHINE SULFATE 10 MG/ML
5 INJECTION, SOLUTION INTRAMUSCULAR; INTRAVENOUS
Status: DISCONTINUED | OUTPATIENT
Start: 2020-07-28 | End: 2020-07-28

## 2020-07-28 RX ORDER — OXYCODONE HYDROCHLORIDE 5 MG/1
5-10 TABLET ORAL
Status: DISCONTINUED | OUTPATIENT
Start: 2020-07-28 | End: 2020-07-30 | Stop reason: HOSPADM

## 2020-07-28 RX ORDER — DIPHENHYDRAMINE HCL 25 MG
25 CAPSULE ORAL
Status: DISCONTINUED | OUTPATIENT
Start: 2020-07-28 | End: 2020-07-30 | Stop reason: HOSPADM

## 2020-07-28 RX ORDER — ZOLPIDEM TARTRATE 5 MG/1
5 TABLET ORAL
Status: DISCONTINUED | OUTPATIENT
Start: 2020-07-28 | End: 2020-07-30 | Stop reason: HOSPADM

## 2020-07-28 RX ORDER — SODIUM CHLORIDE 0.9 % (FLUSH) 0.9 %
5-40 SYRINGE (ML) INJECTION AS NEEDED
Status: DISCONTINUED | OUTPATIENT
Start: 2020-07-28 | End: 2020-07-28

## 2020-07-28 RX ORDER — DOCUSATE SODIUM 100 MG/1
100 CAPSULE, LIQUID FILLED ORAL 2 TIMES DAILY
Status: DISCONTINUED | OUTPATIENT
Start: 2020-07-28 | End: 2020-07-30 | Stop reason: HOSPADM

## 2020-07-28 RX ORDER — ONDANSETRON 4 MG/1
4 TABLET, ORALLY DISINTEGRATING ORAL
Status: DISCONTINUED | OUTPATIENT
Start: 2020-07-28 | End: 2020-07-30 | Stop reason: HOSPADM

## 2020-07-28 RX ORDER — SIMETHICONE 80 MG
80 TABLET,CHEWABLE ORAL
Status: DISCONTINUED | OUTPATIENT
Start: 2020-07-28 | End: 2020-07-30 | Stop reason: HOSPADM

## 2020-07-28 RX ADMIN — DOCUSATE SODIUM 100 MG: 100 CAPSULE, LIQUID FILLED ORAL at 10:35

## 2020-07-28 RX ADMIN — IBUPROFEN 600 MG: 600 TABLET, FILM COATED ORAL at 20:59

## 2020-07-28 RX ADMIN — KETOROLAC TROMETHAMINE 30 MG: 30 INJECTION, SOLUTION INTRAMUSCULAR at 08:03

## 2020-07-28 RX ADMIN — SIMETHICONE 80 MG: 80 TABLET, CHEWABLE ORAL at 10:35

## 2020-07-28 RX ADMIN — SIMETHICONE 80 MG: 80 TABLET, CHEWABLE ORAL at 15:00

## 2020-07-28 RX ADMIN — DOCUSATE SODIUM 100 MG: 100 CAPSULE, LIQUID FILLED ORAL at 17:02

## 2020-07-28 RX ADMIN — KETOROLAC TROMETHAMINE 30 MG: 30 INJECTION, SOLUTION INTRAMUSCULAR at 02:33

## 2020-07-28 RX ADMIN — OXYCODONE 10 MG: 5 TABLET ORAL at 17:01

## 2020-07-28 RX ADMIN — OXYCODONE 10 MG: 5 TABLET ORAL at 00:55

## 2020-07-28 RX ADMIN — OXYCODONE 10 MG: 5 TABLET ORAL at 20:59

## 2020-07-28 RX ADMIN — OXYCODONE 10 MG: 5 TABLET ORAL at 06:36

## 2020-07-28 RX ADMIN — OXYCODONE 10 MG: 5 TABLET ORAL at 12:50

## 2020-07-28 RX ADMIN — PRENATAL VIT W/ FE FUMARATE-FA TAB 27-0.8 MG 1 TABLET: 27-0.8 TAB at 10:34

## 2020-07-28 RX ADMIN — IBUPROFEN 600 MG: 600 TABLET, FILM COATED ORAL at 15:00

## 2020-07-28 NOTE — PROGRESS NOTES
Progress Note                               Patient: Vicki Mcmanus MRN: [id number]  SSN: [id number]    YOB: 1985  Age: 37 y.o. Sex: female      1 Day Post-Op     Subjective:     Patient doing well postpartum without significant complaints. Voiding without difficulty. Patient reports normal lochia. . Breastfeeding: Yes     Objective:     Patient Vitals for the past 18 hrs:   Temp Pulse Resp BP SpO2   20 0700 97.9 °F (36.6 °C) 87 16 104/61 95 %   20 0354 98.3 °F (36.8 °C) 82 16 125/53 96 %   20 2303 98.3 °F (36.8 °C) 84 16 99/52 95 %   20 2000 98.4 °F (36.9 °C) 89 17 107/67 95 %       Temp (24hrs), Av.3 °F (36.8 °C), Min:97.9 °F (36.6 °C), Max:98.6 °F (37 °C)      Physical Exam:    General:   Patient without distress. Abdomen: Soft, fundus firm at level of umbilicus, nontender   Incision: Clean, dry, and intact without erythema   Lower Extremities: Negative for swelling, cords, or tenderness        Lab/Data Review:  CBC:    Recent Labs     20  0627 20  0606   WBC 10.6 9.8   HGB 10.1* 13.1   HCT 30.9* 39.2   * 207     Blood Type:   Lab Results   Component Value Date/Time    ABO/Rh(D) O POSITIVE 2020 06:06 AM      Infant's Blood Type: Information for the patient's :  Angi Saba [[id number]]     Lab Results   Component Value Date/Time    ABO/Rh(D) O POSITIVE 2020 08:18 AM          Assessment and Plan:     Patient appears to be having an uncomplicated post- course. Continue routine postoperative care and maternal education.     Melvin Guillermo MD  12:43 PM

## 2020-07-28 NOTE — PROGRESS NOTES
07/28/20 0101   Pain Assessment   Pain Scale 1 Numeric (0 - 10)   Pain Intensity 1 5   Pain Location 1 Abdomen; Incisional   Pain Description 1 Aching; Sore   Pain Intervention(s) 1 Medication (see MAR)     PRN Oxycodone 10mg for pain

## 2020-07-28 NOTE — PROGRESS NOTES
07/28/20 0642   Pain Assessment   Pain Scale 1 Numeric (0 - 10)   Pain Intensity 1 6   Pain Location 1 Abdomen; Incisional   Pain Description 1 Aching; Sore   Pain Intervention(s) 1 Medication (see MAR)     PRN Oxycodone 10mg for pain

## 2020-07-28 NOTE — LACTATION NOTE
This note was copied from a baby's chart. Mother informed that she needs to be feeding the infant every 2-3 hours even if she has to wake the infant up.

## 2020-07-28 NOTE — PROGRESS NOTES
Chart reviewed - multiple mental health diagnoses. SW met with patient/FOB while practicing social distancing. Patient denies any history of postpartum depression/anxiety specifically, but she states that she does experience generalized depression/anxiety. Patient is currently on Zoloft, but previously had not taken medication \"in years. \"  She was started on Zoloft in April due to increasing symptoms and feeling like things were \"getting bad. \"  She partially attributes these feelings to COVID/quarantining. Patient reports that the Zoloft has helped to managed her mood very well. Patient given informational packet on  mood & anxiety disorders (resources/education) & PCP list.    Family denies any additional needs from  at this time. Family has 's contact information should any needs/questions arise.     PRANAY Walton-CARLA  [address] [place] [address]   [phone]

## 2020-07-28 NOTE — PROGRESS NOTES
07/28/20 0235   Pain Assessment   Pain Scale 1 Numeric (0 - 10)   Pain Intensity 1 3   Pain Location 1 Abdomen; Incisional   Pain Description 1 Aching; Sore   Pain Intervention(s) 1 Medication (see MAR)     PRN Toradol 30mg/IV for pain.

## 2020-07-28 NOTE — LACTATION NOTE
This note was copied from a baby's chart. Mom already supplementing. Assisted with breastfeeding in football on L and R.  Baby fed poorly on L, some better on R. Did not stay on well on L, too shallow. Better latch on R. Suggested cross cradle for L side. Demonstrated manual lip flange. Encouraged frequent feeding and watch output. Mom desires to continue to supplement so started her pumping as well. Got drops. Demoed use of her pump in style and suction tested it. Discussed normal  behavior. May take baby a little while to figure out how to nurse well. Plan to continued trying at breast and pumping if no latch. Will follow output and weight loss. Will continue to assist with positioning and technique. Encouraged attempt at breast and follow plan.

## 2020-07-29 PROCEDURE — 65270000029 HC RM PRIVATE

## 2020-07-29 PROCEDURE — 74011250637 HC RX REV CODE- 250/637: Performed by: OBSTETRICS & GYNECOLOGY

## 2020-07-29 RX ADMIN — OXYCODONE 10 MG: 5 TABLET ORAL at 19:40

## 2020-07-29 RX ADMIN — DOCUSATE SODIUM 100 MG: 100 CAPSULE, LIQUID FILLED ORAL at 09:38

## 2020-07-29 RX ADMIN — IBUPROFEN 600 MG: 600 TABLET, FILM COATED ORAL at 18:24

## 2020-07-29 RX ADMIN — OXYCODONE 5 MG: 5 TABLET ORAL at 23:44

## 2020-07-29 RX ADMIN — OXYCODONE 10 MG: 5 TABLET ORAL at 00:45

## 2020-07-29 RX ADMIN — OXYCODONE 10 MG: 5 TABLET ORAL at 05:05

## 2020-07-29 RX ADMIN — IBUPROFEN 600 MG: 600 TABLET, FILM COATED ORAL at 03:24

## 2020-07-29 RX ADMIN — DOCUSATE SODIUM 100 MG: 100 CAPSULE, LIQUID FILLED ORAL at 18:24

## 2020-07-29 RX ADMIN — OXYCODONE 10 MG: 5 TABLET ORAL at 09:38

## 2020-07-29 RX ADMIN — IBUPROFEN 600 MG: 600 TABLET, FILM COATED ORAL at 11:26

## 2020-07-29 RX ADMIN — OXYCODONE 10 MG: 5 TABLET ORAL at 14:58

## 2020-07-29 RX ADMIN — PRENATAL VIT W/ FE FUMARATE-FA TAB 27-0.8 MG 1 TABLET: 27-0.8 TAB at 09:38

## 2020-07-29 NOTE — PROGRESS NOTES
07/29/20 1458   Pain Assessment   Pain Scale 1 Numeric (0 - 10)   Pain Intensity 1 5   Pain Location 1 Abdomen   Pain Orientation 1 Lower   Pain Description 1 Aching   Pain Intervention(s) 1 Medication (see MAR)   Vital Signs   Level of Consciousness Alert   medicated with Oxycodone 10mg per request of patient.

## 2020-07-29 NOTE — PROGRESS NOTES
Patient up to bathroom with RUDY champion assistance. Maty-care taught and completed. Questions encouraged and answered. Patient ambulating without difficulty, encouraged to call for needs or concerns. Verbalizes understanding.

## 2020-07-29 NOTE — LACTATION NOTE
This note was copied from a baby's chart. In to see mom and infant. Dad feeding baby a bottle of formula at this time. Mom states baby is latching well, but not interested in breast feeding due to not much milk yet. She has pumped a few times, but states not getting anything really. Reviewed normal pump volumes and encouraged if wanting milk to come in stronger, but stimulate whether breast feeding or pumping, etc. Offered to help mom w/ breast feeding today. Mom politely declined assistance. Will call for help as desired. Mom mostly must bottle feeding formula. Will follow up in am as needed.

## 2020-07-29 NOTE — PROGRESS NOTES
Problem:  Delivery: Day of Delivery  Goal: *Participates in infant care  Outcome: Resolved/Met     Problem:  Delivery: Postpartum Day 1  Goal: Off Pathway (Use only if patient is Off Pathway)  Outcome: Resolved/Met  Goal: Activity/Safety  Outcome: Resolved/Met  Goal: Consults, if ordered  Outcome: Resolved/Met  Goal: Diagnostic Test/Procedures  Outcome: Resolved/Met  Goal: Nutrition/Diet  Outcome: Resolved/Met  Goal: Discharge Planning  Outcome: Resolved/Met  Goal: Medications  Outcome: Resolved/Met  Goal: Respiratory  Outcome: Resolved/Met  Goal: Treatments/Interventions/Procedures  Outcome: Resolved/Met  Goal: Psychosocial  Outcome: Resolved/Met  Goal: *Vital signs within defined limits  Outcome: Resolved/Met  Goal: *Labs within defined limits  Outcome: Resolved/Met  Goal: *Hemodynamically stable  Outcome: Resolved/Met  Goal: *Optimal pain control at patient's stated goal  Outcome: Resolved/Met  Goal: *Participates in infant care  Outcome: Resolved/Met  Goal: *Demonstrates progressive activity  Outcome: Resolved/Met  Goal: *Tolerating diet  Outcome: Resolved/Met  Goal: *Performs self perineal care  Outcome: Resolved/Met

## 2020-07-29 NOTE — PROGRESS NOTES
07/29/20 0938   Pain Assessment   Pain Scale 1 Numeric (0 - 10)   Pain Intensity 1 7   Pain Location 1 Abdomen;Back   Pain Orientation 1 Lower   Pain Description 1 Aching   Pain Intervention(s) 1 Medication (see MAR)   Vital Signs   Level of Consciousness Alert   medicated with Oxycodone 10mg PO

## 2020-07-29 NOTE — PROGRESS NOTES
Progress Note                               Patient: Domonique Burt MRN: [id number]  SSN: [id number]    YOB: 1985  Age: 37 y.o. Sex: female      2 Days Post-Op     Subjective:     Patient doing well postpartum without significant complaints. Voiding without difficulty. Patient reports normal lochia. . Breastfeeding: Yes     Objective:     Patient Vitals for the past 18 hrs:   Temp Pulse Resp BP SpO2   20 0723 98.3 °F (36.8 °C) 82 16 107/68 95 %   20 2356 98.2 °F (36.8 °C) 91 16 125/69 95 %       Temp (24hrs), Av.8 °F (36.6 °C), Min:97 °F (36.1 °C), Max:98.3 °F (36.8 °C)      Physical Exam:    General:   Patient without distress. Abdomen: Soft, fundus firm at level of umbilicus, nontender   Incision: Clean, dry, and intact without erythema   Lower Extremities: Negative for swelling, cords, or tenderness        Lab/Data Review: All lab results for the last 24 hours reviewed. CBC:    Recent Labs     20  0627 20  0606   WBC 10.6 9.8   HGB 10.1* 13.1   HCT 30.9* 39.2   * 207     GBS:   Lab Results   Component Value Date/Time    GrBStrep, External Negative 2020     Blood Type:   Lab Results   Component Value Date/Time    ABO/Rh(D) O POSITIVE 2020 06:06 AM        Assessment and Plan:     Patient appears to be having an uncomplicated post- course. Continue routine postoperative care and maternal education.

## 2020-07-29 NOTE — PROGRESS NOTES
07/29/20 1825   Pain Assessment   Pain Scale 1 Numeric (0 - 10)   Pain Intensity 1 6   Pain Location 1 Abdomen   Pain Orientation 1 Lower   Pain Description 1 Aching   Pain Intervention(s) 1 Medication (see MAR)   Vital Signs   Level of Consciousness Alert   medicated with Motrin for pain.

## 2020-07-30 VITALS
SYSTOLIC BLOOD PRESSURE: 112 MMHG | BODY MASS INDEX: 39.27 KG/M2 | DIASTOLIC BLOOD PRESSURE: 69 MMHG | HEART RATE: 77 BPM | OXYGEN SATURATION: 95 % | RESPIRATION RATE: 18 BRPM | WEIGHT: 208 LBS | TEMPERATURE: 98 F | HEIGHT: 61 IN

## 2020-07-30 PROCEDURE — 74011250637 HC RX REV CODE- 250/637: Performed by: OBSTETRICS & GYNECOLOGY

## 2020-07-30 PROCEDURE — 74011250636 HC RX REV CODE- 250/636: Performed by: OBSTETRICS & GYNECOLOGY

## 2020-07-30 PROCEDURE — 90715 TDAP VACCINE 7 YRS/> IM: CPT | Performed by: OBSTETRICS & GYNECOLOGY

## 2020-07-30 RX ORDER — IBUPROFEN 800 MG/1
800 TABLET ORAL EVERY 8 HOURS
Qty: 21 TAB | Refills: 0 | Status: SHIPPED | OUTPATIENT
Start: 2020-07-30 | End: 2020-09-10

## 2020-07-30 RX ORDER — OXYCODONE HYDROCHLORIDE 5 MG/1
5 TABLET ORAL
Qty: 20 TAB | Refills: 0 | Status: SHIPPED | OUTPATIENT
Start: 2020-07-30 | End: 2020-08-06

## 2020-07-30 RX ADMIN — IBUPROFEN 600 MG: 600 TABLET, FILM COATED ORAL at 08:40

## 2020-07-30 RX ADMIN — OXYCODONE 10 MG: 5 TABLET ORAL at 03:49

## 2020-07-30 RX ADMIN — TETANUS TOXOID, REDUCED DIPHTHERIA TOXOID AND ACELLULAR PERTUSSIS VACCINE, ADSORBED 0.5 ML: 5; 2.5; 8; 8; 2.5 SUSPENSION INTRAMUSCULAR at 08:41

## 2020-07-30 RX ADMIN — OXYCODONE 10 MG: 5 TABLET ORAL at 12:49

## 2020-07-30 RX ADMIN — OXYCODONE 10 MG: 5 TABLET ORAL at 08:40

## 2020-07-30 RX ADMIN — PRENATAL VIT W/ FE FUMARATE-FA TAB 27-0.8 MG 1 TABLET: 27-0.8 TAB at 08:40

## 2020-07-30 RX ADMIN — DOCUSATE SODIUM 100 MG: 100 CAPSULE, LIQUID FILLED ORAL at 08:40

## 2020-07-30 RX ADMIN — IBUPROFEN 600 MG: 600 TABLET, FILM COATED ORAL at 01:07

## 2020-07-30 NOTE — DISCHARGE SUMMARY
Obstetrical Discharge Summary     Name: Yobany Valiente MRN: [id number]  SSN: [id number]    YOB: 1985  Age: 37 y.o. Sex: female      Allergies: Aspirin; Gabapentin; and Lamotrigine    Admit Date: 2020    Discharge Date: 2020     Admitting Physician: Pauline Courtney MD     Attending Physician:  Arlena Habermann, MD     * Admission Diagnoses: Advanced maternal age in multigravida [O09.529];H/O  section [Z98.891];39 weeks gestation of pregnancy [Z3A.39]    * Discharge Diagnoses:   Information for the patient's :  Nicolette Werner [[id number]]   Delivery of a 7 lb 4.1 oz (3.29 kg) male infant via , Low Transverse on 2020 at 8:18 AM  by Pauline Courtney. Apgars were 8  and 9 . Additional Diagnoses:   Hospital Problems as of 2020 Date Reviewed: 2020          Codes Class Noted - Resolved POA    Mental disorder affecting pregnancy in third trimester ICD-10-CM: O99.343  ICD-9-CM: 648.43  2020 - Present Yes    Overview Addendum 2020  9:17 AM by Arlena Habermann, MD     2020: hx depression, anxiety, BPD.   3/9/2020 at [facility]:  Patient with depression/anxiety. Not currently on medication; stable. Denies SI/HI.  2020 at [facility]:  symptoms have developed, denies SI/HI - starting Zoloft  2020 at [facility]:  Stable on Zoloft 50 mg daily. Denies SI/HI. Multigravida of advanced maternal age in third trimester ICD-10-CM: O09.523  ICD-9-CM: 659.63  2019 - Present Yes    Overview Addendum 2020  2:09 PM by Sky Collins NP     EDC by LMP confirmed by 7  weeks US  PLAN:  NIPT (neg x 3, male), baby ASA 12-36 weeks, MFM for anatomy/fetal echo,  testing around 35 weeks with delivery around 39 weeks  2020: Pt reports allergy to ASA (unsure reaction, told as child to avoid ASA) but is taking ASA 81mg daily  CF, SMA, AFP negative    3/9/2020 at [facility]:  Normal anatomy/fetal Echo, AC 49%. Fibroid noted measuring 4.8 x 3.4 x 4.0 cm. Met with genetic counselor today; see her report for full details. [id number] Negative, Male (drawn in OB's). 2020 at [facility]:  Appropriate fetal growth, normal f/u Echo. Fibroid stable in size and appearance. AC 49%, overall 45%, POLA 20 cm  6/10/2020:  EFW 46%, AC 48%, POLA 17.7 cm, vtx  2020: EFW 47% AC 58%, BPP 8/8, POLA nl, vertex  2020: EFW 51%, AC 50%, BPP 8/8, POLA nl, vertex             * (Principal) Previous  delivery, antepartum ICD-10-CM: O34.219  ICD-9-CM: 654.23  2019 - Present Yes    Overview Addendum 6/10/2020 11:49 AM by Kendra Chavez MD     D/W pt risks of , including but not limited to: risk of uterine rupture during labor of approximately 1% with a subsequent 40-50% risk of  neurological injury and or death. D/W pt operative risks of repeat C/S including but not limited to death, bleeding, infection, damage to other internal organs and other risks involving future pregnancies and mode of delivery. Also, D/W pt increase in maternal and fetal morbidity after failed JAYDEN.     3/9/2020 [facility]: vascularity at site of MARY.  MFM to reassess in 6-8 weeks -- WNL  2020:  Pt desires repeat when indicated    6/10/2020:  Scheduled for 20 @ 0[address] [facility] 6/10 at 11:48)                  Lab Results   Component Value Date/Time    ABO/Rh(D) O POSITIVE 2020 06:06 AM    Rubella, External Immune 2019    GrBStrep, External Negative 2020      Immunization History   Administered Date(s) Administered    Influenza Vaccine (Quad) PF 2019, 2019    Pneumococcal Polysaccharide (PPSV-23) 2019    Tdap 2006, 2020       * Procedures:   Procedure(s) with comments:   SECTION - baldo 2020 Repeat  Section      Halima Mcintyre  Depression Scale  I have been able to laugh and see the funny side of things: As much as I always could  I have looked forward with enjoyment to things: As much as I ever did  I have blamed myself unnecessarily when things went wrong: Not very often  I have been anxious or worried for no good reason: Yes, sometimes  I have felt scared or panicky for no very good reason: No, not at all  Things have been getting on top of me: No, I have been coping as well as ever  I have been so unhappy that I have had difficulty sleeping: No, not at all  I have felt sad or miserable: No, not at all  I have been so unhappy that I have been crying: No, never  The thought of harming myself has occurred to me: Never  Total Score: 3    * Discharge Condition: good    * Hospital Course: Normal hospital course following the delivery. * Disposition: Home    Discharge Medications:   Current Discharge Medication List      START taking these medications    Details   ibuprofen (MOTRIN) 800 mg tablet Take 1 Tab by mouth every eight (8) hours. Indications: pain  Qty: 21 Tab, Refills: 0    Associated Diagnoses:  delivery delivered      oxyCODONE IR (ROXICODONE) 5 mg immediate release tablet Take 1 Tab by mouth every four (4) hours as needed for Pain for up to 7 days. Max Daily Amount: 30 mg. Can take 5-10 mg q 6 hours, take the lowest effective dose. Qty: 20 Tab, Refills: 0    Associated Diagnoses:  delivery delivered         CONTINUE these medications which have NOT CHANGED    Details   sertraline (ZOLOFT) 50 mg tablet Take 1 Tab by mouth daily. Qty: 30 Tab, Refills: 6      acetaminophen (TYLENOL EXTRA STRENGTH) 500 mg tablet Take  by mouth every six (6) hours as needed for Pain.      loratadine (CLARITIN) 10 mg tablet Take 10 mg by mouth.      prenatal multivit-ca-min-fe-fa (PRENATAL VITAMIN) tab Take  by mouth. * Follow-up Care/Patient Instructions: Activity: No lifting, Driving, or Strenuous exercise for 2-4 weeks, No driving for 2 weeks, No sex for 6 weeks and No driving while on analgesics  Diet: Regular Diet  Wound Care:  As directed    Follow-up Information     Follow up With Specialties Details Why Contact Info    None    None ([phone]) Patient stated that they have no PCP

## 2020-07-30 NOTE — DISCHARGE INSTRUCTIONS
Patient Education   Discharge instruction to follow: Activity: Pelvis rest for 6 weeks     No heavy lifting over 15 lbs or strenuous exercise for 4 weeks     No driving for 2 weeks     No push/pull motion such as sweeping or vacuuming for 4 weeks     No tub baths for 6 weeks     section keep incision clean and dry, may shower as normal with soap and water. Inspect incision every day for signs of infection listed below. Continue to use danielle-bottle with every void or bowel movement until comfortable stopping. Change sanitary pad after each urination or bowel movement. Call MD for the following:      Fever over 100.5 F; pain not relieved by medication; foul smelling vaginal discharge or increase in vaginal bleeding. Redness, swelling, or drainage from  incision. Take medication as prescribed. Follow up with MD as order. Pelvic rest x 6 wk ( no sex, swimming, tampons or douching)  Pt may drive after 2-4 weeks as long as she is NOT taking narcotics & can quickly maneuver her foot from the gas to the brake. For the next 2-4 weeks:  eat, shower and nurse the baby. Advance activity as tolerated. Notify [facility] for temp > 100.5, vaginal discharge with odor, heavy VB, worsening pelvic/abdominal pain and/or other concerns. After Your Delivery (the Postpartum Period): Care Instructions  Your Care Instructions    Congratulations on the birth of your baby. Like pregnancy, the  period can be a time of excitement, tiffanie, and exhaustion. You may look at your wondrous little baby and feel happy. You may also be overwhelmed by your new sleep hours and new responsibilities. At first, babies often sleep during the days and are awake at night. They do not have a pattern or routine. They may make sudden gasps, jerk themselves awake, or look like they have crossed eyes. These are all normal, and they may even make you smile. In these first weeks after delivery, try to take good care of yourself. It may take 4 to 6 weeks to feel like yourself again, and possibly longer if you had a  birth. You will likely feel very tired for several weeks. Your days will be full of ups and downs, but lots of tiffanie as well. Follow-up care is a key part of your treatment and safety. Be sure to make and go to all appointments, and call your doctor if you are having problems. It's also a good idea to know your test results and keep a list of the medicines you take. How can you care for yourself at home? Take care of your body after delivery  · Use pads instead of tampons for the bloody flow that may last as long as 2 weeks. · Ease cramps with ibuprofen (Advil, Motrin). · Ease soreness of hemorrhoids and the area between your vagina and rectum with ice compresses or witch hazel pads. · Ease constipation by drinking lots of fluid and eating high-fiber foods. Ask your doctor about over-the-counter stool softeners. · Cleanse yourself with a gentle squeeze of warm water from a bottle instead of wiping with toilet paper. · Take a sitz bath in warm water several times a day. · Wear a good nursing bra. Ease sore and swollen breasts with warm, wet washcloths. · If you are not breastfeeding, use ice rather than heat for breast soreness. · Your period may not start for several months if you are breastfeeding. You may bleed more, and longer at first, than you did before you got pregnant. · Wait until you are healed (about 4 to 6 weeks) before you have sexual intercourse. Your doctor will tell you when it is okay to have sex. · Try not to travel with your baby for 5 or 6 weeks. If you take a long car trip, make frequent stops to walk around and stretch. Avoid exhaustion  · Rest every day. Try to nap when your baby naps. · Ask another adult to be with you for a few days after delivery. · Plan for  if you have other children. · Stay flexible so you can eat at odd hours and sleep when you need to.  Both you and your baby are making new schedules. · Plan small trips to get out of the house. Change can make you feel less tired. · Ask for help with housework, cooking, and shopping. Remind yourself that your job is to care for your baby. Know about help for postpartum depression  · \"Baby blues\" are common for the first 1 to 2 weeks after birth. You may cry or feel sad or irritable for no reason. · Rest whenever you can. Being tired makes it harder to handle your emotions. · Go for walks with your baby. · Talk to your partner, friends, and family about your feelings. · If your symptoms last for more than a few weeks, or if you feel very depressed, ask your doctor for help. · Postpartum depression can be treated. Support groups and counseling can help. Sometimes medicine can also help. Stay healthy  · Eat healthy foods so you have more energy and lose extra baby pounds. · If you breastfeed, avoid drugs. If you quit smoking during pregnancy, try to stay smoke-free. If you choose to have a drink now and then, have only one drink, and limit the number of occasions that you have a drink. Wait to breastfeed at least 2 hours after you have a drink to reduce the amount of alcohol the baby may get in the milk. · Start daily exercise after 4 to 6 weeks, but rest when you feel tired. · Learn exercises to tone your belly. Do Kegel exercises to regain strength in your pelvic muscles. You can do these exercises while you stand or sit. ? Squeeze the same muscles you would use to stop your urine. Your belly and thighs should not move. ? Hold the squeeze for 3 seconds, and then relax for 3 seconds. ? Start with 3 seconds. Then add 1 second each week until you are able to squeeze for 10 seconds. ? Repeat the exercise 10 to 15 times for each session. Do three or more sessions each day. · Find a class for new mothers and new babies that has an exercise time.   · If you had a  birth, give yourself a bit more time before you exercise, and be careful. When should you call for help? Call  911 anytime you think you may need emergency care. For example, call if:    · You have thoughts of harming yourself, your baby, or another person.     · You passed out (lost consciousness).     · You have chest pain, are short of breath, or cough up blood.     · You have a seizure.    Call your doctor now or seek immediate medical care if:    · You have severe vaginal bleeding. This means you are passing blood clots and soaking through a pad each hour for 2 or more hours.     · You are dizzy or lightheaded, or you feel like you may faint.     · You have a fever.     · You have new or more belly pain.     · You have signs of a blood clot in your leg (called a deep vein thrombosis), such as:  ? Pain in the calf, back of the knee, thigh, or groin. ? Redness and swelling in your leg or groin.     · You have signs of preeclampsia, such as:  ? Sudden swelling of your face, hands, or feet. ? New vision problems (such as dimness, blurring, or seeing spots). ? A severe headache.    Watch closely for changes in your health, and be sure to contact your doctor if:    · Your vaginal bleeding seems to be getting heavier.     · You have new or worse vaginal discharge.     · You feel sad, anxious, or hopeless for more than a few days.     · You do not get better as expected. Where can you learn more? Go to http://www.[employer].com/  Enter A461 in the search box to learn more about \"After Your Delivery (the Postpartum Period): Care Instructions. \"  Current as of: May 29, 2019Content Version: 12.4  © 5120-4535 [employer]. Care instructions adapted under license by [employer] (which disclaims liability or warranty for this information).  If you have questions about a medical condition or this instruction, always ask your healthcare professional. [place] [address] any warranty or liability for your use of this information.

## 2020-08-10 PROBLEM — O34.10 UTERINE FIBROID DURING PREGNANCY, ANTEPARTUM: Status: RESOLVED | Noted: 2020-03-09 | Resolved: 2020-08-10

## 2020-08-10 PROBLEM — O99.343 MENTAL DISORDER AFFECTING PREGNANCY IN THIRD TRIMESTER: Status: RESOLVED | Noted: 2020-02-13 | Resolved: 2020-08-10

## 2020-08-10 PROBLEM — Z09 POSTOP CHECK: Status: ACTIVE | Noted: 2020-08-10

## 2020-08-10 PROBLEM — O34.219 PREVIOUS CESAREAN DELIVERY, ANTEPARTUM: Status: RESOLVED | Noted: 2019-12-19 | Resolved: 2020-08-10

## 2020-08-10 PROBLEM — O09.523 MULTIGRAVIDA OF ADVANCED MATERNAL AGE IN THIRD TRIMESTER: Status: RESOLVED | Noted: 2019-12-19 | Resolved: 2020-08-10

## 2020-08-10 PROBLEM — D25.9 UTERINE FIBROID DURING PREGNANCY, ANTEPARTUM: Status: RESOLVED | Noted: 2020-03-09 | Resolved: 2020-08-10

## 2020-10-07 NOTE — PROGRESS NOTES
Anesthesiology  Post-op Note    Post-op day 1 s/p  via spinal with neuraxial opioids for post-op pain management. Visit Vitals  /69 (BP 1 Location: Left arm, BP Patient Position: At rest)   Pulse 77   Temp 36.7 °C (98 °F)   Resp 18   Ht 5' 1\" (1.549 m)   Wt 94.3 kg (208 lb)   LMP 10/26/2019   SpO2 95%   Breastfeeding Yes   BMI 39.30 kg/m²     Airway patent, patient appropriately hydrated and appears euvolemic. Patient is Alert and oriented. Pain is well controlled. Pruritus is well controlled. Nausea is well controlled. No complaints about back or site of injection. Motor and sensory function has returned to baseline in lower extremities. Patient is satisfied with anesthetic and reports no complications. Continue current orders, then initiate surgeon's orders for pain management 24 hours after . Follow up per surgeon.

## 2022-01-27 PROBLEM — N90.89 VULVAL LESION: Status: ACTIVE | Noted: 2022-01-27

## 2022-01-27 PROBLEM — R10.2 PELVIC PAIN: Status: ACTIVE | Noted: 2022-01-27

## 2022-01-28 ENCOUNTER — APPOINTMENT (OUTPATIENT)
Dept: ULTRASOUND IMAGING | Age: 37
End: 2022-01-28
Attending: PHYSICIAN ASSISTANT
Payer: COMMERCIAL

## 2022-01-28 ENCOUNTER — HOSPITAL ENCOUNTER (EMERGENCY)
Age: 37
Discharge: HOME | End: 2022-01-28
Attending: EMERGENCY MEDICINE
Payer: COMMERCIAL

## 2022-01-28 VITALS
HEART RATE: 85 BPM | OXYGEN SATURATION: 100 % | SYSTOLIC BLOOD PRESSURE: 138 MMHG | WEIGHT: 158 LBS | BODY MASS INDEX: 29.83 KG/M2 | DIASTOLIC BLOOD PRESSURE: 95 MMHG | TEMPERATURE: 98.6 F | HEIGHT: 61 IN | RESPIRATION RATE: 16 BRPM

## 2022-01-28 DIAGNOSIS — N93.9 VAGINAL BLEEDING: ICD-10-CM

## 2022-01-28 DIAGNOSIS — D25.9 UTERINE LEIOMYOMA, UNSPECIFIED LOCATION: Primary | ICD-10-CM

## 2022-01-28 LAB
ALBUMIN SERPL-MCNC: 3.4 G/DL (ref 3.5–5)
ALBUMIN/GLOB SERPL: 0.8 {RATIO} (ref 1.2–3.5)
ALP SERPL-CCNC: 90 U/L (ref 50–136)
ALT SERPL-CCNC: 25 U/L (ref 12–65)
ANION GAP SERPL CALC-SCNC: 4 MMOL/L (ref 7–16)
AST SERPL-CCNC: 16 U/L (ref 15–37)
BACTERIA URNS QL MICRO: ABNORMAL /HPF
BASOPHILS # BLD: 0.1 K/UL (ref 0–0.2)
BASOPHILS NFR BLD: 1 % (ref 0–2)
BILIRUB SERPL-MCNC: 0.2 MG/DL (ref 0.2–1.1)
BUN SERPL-MCNC: 13 MG/DL (ref 6–23)
CALCIUM SERPL-MCNC: 8.6 MG/DL (ref 8.3–10.4)
CASTS URNS QL MICRO: ABNORMAL /LPF
CHLORIDE SERPL-SCNC: 109 MMOL/L (ref 98–107)
CO2 SERPL-SCNC: 29 MMOL/L (ref 21–32)
CREAT SERPL-MCNC: 0.8 MG/DL (ref 0.6–1)
CRYSTALS URNS QL MICRO: 0 /LPF
DIFFERENTIAL METHOD BLD: NORMAL
EOSINOPHIL # BLD: 0.2 K/UL (ref 0–0.8)
EOSINOPHIL NFR BLD: 2 % (ref 0.5–7.8)
EPI CELLS #/AREA URNS HPF: ABNORMAL /HPF
ERYTHROCYTE [DISTWIDTH] IN BLOOD BY AUTOMATED COUNT: 12.2 % (ref 11.9–14.6)
GLOBULIN SER CALC-MCNC: 4.3 G/DL (ref 2.3–3.5)
GLUCOSE SERPL-MCNC: 95 MG/DL (ref 65–100)
HCG UR QL: NEGATIVE
HCT VFR BLD AUTO: 41.8 % (ref 35.8–46.3)
HGB BLD-MCNC: 13.9 G/DL (ref 11.7–15.4)
IMM GRANULOCYTES # BLD AUTO: 0 K/UL (ref 0–0.5)
IMM GRANULOCYTES NFR BLD AUTO: 0 % (ref 0–5)
LYMPHOCYTES # BLD: 3.2 K/UL (ref 0.5–4.6)
LYMPHOCYTES NFR BLD: 35 % (ref 13–44)
MCH RBC QN AUTO: 30.1 PG (ref 26.1–32.9)
MCHC RBC AUTO-ENTMCNC: 33.3 G/DL (ref 31.4–35)
MCV RBC AUTO: 90.5 FL (ref 79.6–97.8)
MONOCYTES # BLD: 0.6 K/UL (ref 0.1–1.3)
MONOCYTES NFR BLD: 7 % (ref 4–12)
MUCOUS THREADS URNS QL MICRO: ABNORMAL /LPF
NEUTS SEG # BLD: 5.1 K/UL (ref 1.7–8.2)
NEUTS SEG NFR BLD: 55 % (ref 43–78)
NRBC # BLD: 0 K/UL (ref 0–0.2)
PLATELET # BLD AUTO: 351 K/UL (ref 150–450)
PMV BLD AUTO: 9.9 FL (ref 9.4–12.3)
POTASSIUM SERPL-SCNC: 3.5 MMOL/L (ref 3.5–5.1)
PROT SERPL-MCNC: 7.7 G/DL (ref 6.3–8.2)
RBC # BLD AUTO: 4.62 M/UL (ref 4.05–5.2)
RBC #/AREA URNS HPF: ABNORMAL /HPF
SERVICE CMNT-IMP: NORMAL
SODIUM SERPL-SCNC: 142 MMOL/L (ref 136–145)
WBC # BLD AUTO: 9.3 K/UL (ref 4.3–11.1)
WBC URNS QL MICRO: ABNORMAL /HPF
WET PREP GENITAL: NORMAL
WET PREP GENITAL: NORMAL

## 2022-01-28 PROCEDURE — 80053 COMPREHEN METABOLIC PANEL: CPT

## 2022-01-28 PROCEDURE — 85025 COMPLETE CBC W/AUTO DIFF WBC: CPT

## 2022-01-28 PROCEDURE — 74011250637 HC RX REV CODE- 250/637: Performed by: NURSE PRACTITIONER

## 2022-01-28 PROCEDURE — 87210 SMEAR WET MOUNT SALINE/INK: CPT

## 2022-01-28 PROCEDURE — 81025 URINE PREGNANCY TEST: CPT

## 2022-01-28 PROCEDURE — 87491 CHLMYD TRACH DNA AMP PROBE: CPT

## 2022-01-28 PROCEDURE — 81015 MICROSCOPIC EXAM OF URINE: CPT

## 2022-01-28 PROCEDURE — 76856 US EXAM PELVIC COMPLETE: CPT

## 2022-01-28 PROCEDURE — 99283 EMERGENCY DEPT VISIT LOW MDM: CPT

## 2022-01-28 RX ORDER — HYDROCODONE BITARTRATE AND ACETAMINOPHEN 5; 325 MG/1; MG/1
1 TABLET ORAL ONCE
Status: COMPLETED | OUTPATIENT
Start: 2022-01-28 | End: 2022-01-28

## 2022-01-28 RX ADMIN — HYDROCODONE BITARTRATE AND ACETAMINOPHEN 1 TABLET: 5; 325 TABLET ORAL at 21:39

## 2022-01-28 NOTE — ED NOTES
42 yo female presents with vaginal bleeding and pelvic pain. Reports she was seen at her OBGYN yesterday and had pelvic exam performed and has bleeding and pain since then. VSS. NAD. Patient evaluated initially in triage. Rapid Medical Evaluation was conducted and necessary orders have been placed. I have performed a medical screening exam.  Care will now be transferred to the provider in the back of the emergency department.   Mary Jane Fraga 6:33 PM

## 2022-01-29 NOTE — DISCHARGE INSTRUCTIONS
Return to the ED immediately for any new, worsening, or concerning symptoms, or danger signs as we discussed. Otherwise, follow up with your PCP in 1-2 days for reevaluation. Continue taking antibiotics as prescribed by her gynecologist for presumed UTI. Ibuprofen as discussed. Follow-up with GYN on Monday.

## 2022-01-29 NOTE — ED NOTES
I have reviewed discharge instructions with the patient. The patient verbalized understanding. Patient left ED via Discharge Method: ambulatory to Home with spouse. Opportunity for questions and clarification provided. No acute distress present      Patient given 0 scripts. To continue your aftercare when you leave the hospital, you may receive an automated call from our care team to check in on how you are doing. This is a free service and part of our promise to provide the best care and service to meet your aftercare needs.  If you have questions, or wish to unsubscribe from this service please call [phone]. Thank you for Choosing our [address] [place] [address] Emergency Department.

## 2022-01-29 NOTE — ED PROVIDER NOTES
HPI   15-year-old female with history significant for uterine fibroid, depressive disorder, anxiety, oral and personal disorder; presents to the emergency department with complaint of pelvic pain and vaginal bleeding. States symptoms began 3-4 days a go, has been constant since that time. States that she was evaluated for this complaint by her gynecologist yesterday, had pelvic exam and states that since that time, has had increased pain and increased vaginal bleeding. States she was prescribed Bactrim for presumed UTI, had a labial lesion swabbed for HSV, but states do not suspect that she had HSV. Denies urinary complaints, denies abnormal vaginal discharge or concern for STI. Abdominal pain, flank or back pain, difficulty urinating, malodorous or cloudy urine. Denies fevers or chills no other complaint. Patient is pleasant well-appearing and appears no acute distress. Afebrile hemodynamic stable. Reports compliance with prescribed medications, no other known therapeutic measures, knows nothing else to make worse or better. States that she is saturating less than 1 pad per day. No dizziness or lightheadedness, shortness breath. Alert and orient x3, not toxic appearing appears no distress.   Pleasant, jovial.    Past Medical History:   Diagnosis Date    Anxiety     Borderline personality disorder ([address]     Fibroid, uterine     Major depression        Past Surgical History:   Procedure Laterality Date    HX  SECTION      HX WISDOM TEETH EXTRACTION           Family History:   Problem Relation Age of Onset    Arthritis-rheumatoid Mother     Heart Attack Father     Cancer Brother     Cancer Maternal Grandmother     Breast Cancer Neg Hx     Colon Cancer Neg Hx     Ovarian Cancer Neg Hx     Uterine Cancer Neg Hx        Social History     Socioeconomic History    Marital status:      Spouse name: Not on file    Number of children: Not on file    Years of education: Not on file  Highest education level: Not on file   Occupational History    Not on file   Tobacco Use    Smoking status: Former Smoker     Quit date: 2020     Years since quittin.0    Smokeless tobacco: Never Used   Substance and Sexual Activity    Alcohol use: Not Currently    Drug use: Yes     Types: Marijuana    Sexual activity: Yes     Birth control/protection: None   Other Topics Concern     Service Not Asked    Blood Transfusions Not Asked    Caffeine Concern No    Occupational Exposure Not Asked    Hobby Hazards Not Asked    Sleep Concern Not Asked    Stress Concern Not Asked    Weight Concern Not Asked    Special Diet Not Asked    Back Care Not Asked    Exercise No    Bike Helmet Not Asked    Seat Belt Yes    Self-Exams No   Social History Narrative    Abuse: Feels safe at home, no history of physical abuse, no history of sexual abuse     Social Determinants of Health     Financial Resource Strain:     Difficulty of Paying Living Expenses: Not on file   Food Insecurity:     Worried About Running Out of Food in the Last Year: Not on file    Shelia of Food in the Last Year: Not on file   Transportation Needs:     Lack of Transportation (Medical): Not on file    Lack of Transportation (Non-Medical):  Not on file   Physical Activity:     Days of Exercise per Week: Not on file    Minutes of Exercise per Session: Not on file   Stress:     Feeling of Stress : Not on file   Social Connections:     Frequency of Communication with Friends and Family: Not on file    Frequency of Social Gatherings with Friends and Family: Not on file    Attends Yazdanism Services: Not on file    Active Member of Clubs or Organizations: Not on file    Attends Club or Organization Meetings: Not on file    Marital Status: Not on file   Intimate Partner Violence:     Fear of Current or Ex-Partner: Not on file    Emotionally Abused: Not on file    Physically Abused: Not on file    Sexually Abused: Not on file   Housing Stability:     Unable to Pay for Housing in the Last Year: Not on file    Number of Places Lived in the Last Year: Not on file    Unstable Housing in the Last Year: Not on file         ALLERGIES: Aspirin, Gabapentin, and Lamotrigine    Review of Systems  Constitutional: Negative for fever. Negative for appetite change, chills, diaphoresis and unexpected weight change. HENT: Negative     Eyes: Negative   Respiratory: Negative  Cardiovascular: Negative  GI/: As in HPI  Musculoskeletal: Negative   Skin: Negative     Allergic/Immunologic: Negative  Neurological: Negative                          Vitals:    01/28/22 1831   BP: (!) 129/91   Pulse: 84   Resp: 16   Temp: 98.6 °F (37 °C)   SpO2: 100%   Weight: 71.7 kg (158 lb)   Height: 5' 1\" (1.549 m)            Physical Exam   Constitutional: Oriented to person, place, and time. Appears well-developed and well-nourished. No distress. HENT:    Head: Normocephalic and atraumatic   Right Ear: External ear normal.    Left Ear: External ear normal.     Nose: Nose normal.   Mouth/Throat: Mouth normal.    Eyes: Conjunctivae are normal.   Neck: Supple. No tracheal deviation. Cardiovascular: Normal rate, intact distal pulses. Brisk capillary refill intact, less than 2 seconds. Regular rhythm present. No pitting edema. Pulmonary/Chest: Lungs are clear & equal bilaterally. No adventitious sounds. No respiratory distress. Abdominal: Soft. There is no tenderness, guarding, distention, rigidity rebound, no flank or CVA tenderness. GI/: Exam chaperoned by female RN. Small amount of bright red blood in vault. No obvious lesion, foreign body. Cervical os closed, no visualized lesion. No cervical motion tenderness, no adnexal tenderness. She does have uterine tenderness. Musculoskeletal: No obvious deformity, erythema, edema. Neurological: Alert and oriented to person, place, and time. No numbness/tingling. No loss of sensation.  Positive PMS ×4. GCS= 15. Skin: Skin is warm and dry. Capillary refill takes less than 2 seconds. No abrasion, no lesion, no petechiae and no rash noted. Not diaphoretic. No cyanosis, erythema, or pallor. Psychiatric: Normal mood and affect. Behavior is normal.    Nursing note and vitals reviewed. MDM    10-year-old female with history significant for uterine fibroid, depressive disorder, anxiety, oral and personal disorder; presents to the emergency department with complaint of pelvic pain and vaginal bleeding as in HPI. Patient is pleasant well-appearing, nausea or vomiting. Denies concern for pregnancy or STI. Being treated for UTI. Seen for these complaints by GYN, but states worse pain and increased bleeding since pelvic exam was performed yesterday. She is afebrile hemodynamic stable. Nontoxic-appearing appears no distress. States that her pain has been significantly improved with ibuprofen 600 mg, as prescribed. Normoactive bowel sounds, she has no abdominal tenderness. She reports continued vaginal bleeding and pelvic pain. States she saturating less than 1 pad per day. Labs and urinalysis are reassuring, no obvious UTI, though there are WBCs noted. Negative urine hCG. Discussed with patient and she is agreeable to GYN exam.  Exam as above, largely reassuring, smaller bright red blood. Wet prep with no concerning feature. We obtained G/C swabs, with results respective tomorrow. Discussed empiric antibiotics, which patient declines. Will await results and follow with GYN. Labs, no elevated WBC, reassuring H&H. Potentially patient has UTI, this is being managed with antibiotics from GYN. Ultrasound with uterine fibroids, likely etiology of patient's symptoms. No significant bleeding in the ED. Feel she stable for discharge home. Pain is well controlled at this time.   Informed of danger signs to be watchful of, instructed to return for new, worsening, concerning symptoms, worsening bleeding, worsening pain. Otherwise, to follow-up with GYN on Monday. Discussed with ED attending. Patient is well-hydrated appearing, no distress. Nontoxic-appearing, tolerating oral intake, hemodynamically stable. All findings and plan were discussed with the patient. All questions answered. Discussed with the patient that an unremarkable evaluation in the ED does not preclude the development or presence of a serious or life threatening condition. Patient was instructed to return immediately for any worsening or change in current symptoms, or if symptoms do not continue to improve. I instructed them to follow up with their primary care provider, own specialist, or medical provider that I am recommending for him within the next 2-3 days  The patient acknowledged understanding plan of care and affirmed approval.     Signed by: KANDIS Kenney     This note created using Dragon voice recognition software. Please excuse any accidental errors associated with its use, as note has not been fully proofread and edited.         Procedures

## 2022-02-01 LAB
C TRACH RRNA SPEC QL NAA+PROBE: NEGATIVE
N GONORRHOEA RRNA SPEC QL NAA+PROBE: NEGATIVE
SPECIMEN SOURCE: NORMAL

## (undated) DEVICE — SOLUTION IV 1000ML 0.9% SOD CHL

## (undated) DEVICE — PREMIUM WET SKIN PREP TRAY: Brand: MEDLINE INDUSTRIES, INC.

## (undated) DEVICE — (D)STRIP SKN CLSR 0.5X4IN WHT --

## (undated) DEVICE — PENCIL ES L3M BTTN SWCH S STL HEX LOK BLDE ELECTRD HOLSTER

## (undated) DEVICE — TRAY CATH 16FR PVC INTMIT STR TIP PREATTACH TO 1000ML COLL

## (undated) DEVICE — SOLUTION IRRIG 1000ML H2O STRL BLT

## (undated) DEVICE — AMD ANTIMICROBIAL GAUZE SPONGES,12 PLY USP TYPE VII, 0.2% POLYHEXAMETHYLENE BIGUANIDE HCI (PHMB): Brand: CURITY

## (undated) DEVICE — STERILE POLYISOPRENE POWDER-FREE SURGICAL GLOVES: Brand: PROTEXIS

## (undated) DEVICE — SUTURE VCRL RAPIDE SZ 3-0 L36IN ABSRB UD L36MM CT-1 1/2 CIR VR944

## (undated) DEVICE — MEDI-VAC NON-CONDUCTIVE SUCTION TUBING: Brand: CARDINAL HEALTH

## (undated) DEVICE — SURGICAL PROCEDURE PACK C SECT CDS

## (undated) DEVICE — KENDALL SCD EXPRESS SLEEVES, KNEE LENGTH, MEDIUM: Brand: KENDALL SCD

## (undated) DEVICE — BARRIER TISS ADH ABSRB 3X4IN -- GYNECARE INTERCEED

## (undated) DEVICE — Device: Brand: PORTEX

## (undated) DEVICE — SUTURE VCRL SZ 0 L36IN ABSRB UD L36MM CT-1 1/2 CIR J946H

## (undated) DEVICE — AGENT HEMSTAT 5GM ARISTA AH

## (undated) DEVICE — SUTURE MCRYL SZ 3-0 L27IN ABSRB UD L60MM KS STR REV CUT Y523H

## (undated) DEVICE — REM POLYHESIVE ADULT PATIENT RETURN ELECTRODE: Brand: VALLEYLAB

## (undated) DEVICE — STAPLER SKIN SQ 30 ABSRB STPL DISP INSORB

## (undated) DEVICE — AMD ANTIMICROBIAL NON-ADHERENT PAD,0.2% POLYHEXAMETHYLENE BIGUANIDE HCI (PHMB): Brand: TELFA

## (undated) DEVICE — 3M™ STERI-STRIP™ REINFORCED ADHESIVE SKIN CLOSURES, R1549, 1/2 IN X 2 IN (12 MM X 50 MM), 6 STRIPS/ENVELOPE: Brand: 3M™ STERI-STRIP™

## (undated) DEVICE — SUTURE MCRYL SZ 4-0 L27IN ABSRB UD L19MM PS-2 1/2 CIR PRIM Y426H

## (undated) DEVICE — SUTURE PDS II SZ 0 L27IN ABSRB VLT L36MM CT-1 1/2 CIR Z340H